# Patient Record
Sex: MALE | Race: WHITE
[De-identification: names, ages, dates, MRNs, and addresses within clinical notes are randomized per-mention and may not be internally consistent; named-entity substitution may affect disease eponyms.]

---

## 2020-02-23 ENCOUNTER — HOSPITAL ENCOUNTER (EMERGENCY)
Dept: HOSPITAL 95 - ER | Age: 35
Discharge: HOME | End: 2020-02-23
Payer: COMMERCIAL

## 2020-02-23 VITALS — HEIGHT: 74 IN | BODY MASS INDEX: 40.43 KG/M2 | WEIGHT: 315 LBS

## 2020-02-23 DIAGNOSIS — F17.200: ICD-10-CM

## 2020-02-23 DIAGNOSIS — Q24.6: ICD-10-CM

## 2020-02-23 DIAGNOSIS — Z95.0: ICD-10-CM

## 2020-02-23 DIAGNOSIS — Z88.5: ICD-10-CM

## 2020-02-23 DIAGNOSIS — Z79.899: ICD-10-CM

## 2020-02-23 DIAGNOSIS — I50.9: Primary | ICD-10-CM

## 2020-02-23 DIAGNOSIS — Z91.14: ICD-10-CM

## 2020-02-23 DIAGNOSIS — E66.01: ICD-10-CM

## 2020-02-23 LAB
ALBUMIN SERPL BCP-MCNC: 3.3 G/DL (ref 3.4–5)
ALBUMIN/GLOB SERPL: 0.9 {RATIO} (ref 0.8–1.8)
ALT SERPL W P-5'-P-CCNC: 42 U/L (ref 12–78)
ANION GAP SERPL CALCULATED.4IONS-SCNC: 8 MMOL/L (ref 6–16)
AST SERPL W P-5'-P-CCNC: 33 U/L (ref 12–37)
BASOPHILS # BLD AUTO: 0.04 K/MM3 (ref 0–0.23)
BASOPHILS NFR BLD AUTO: 0 % (ref 0–2)
BILIRUB SERPL-MCNC: 0.7 MG/DL (ref 0.1–1)
BUN SERPL-MCNC: 14 MG/DL (ref 8–24)
CALCIUM SERPL-MCNC: 8.4 MG/DL (ref 8.5–10.1)
CHLORIDE SERPL-SCNC: 108 MMOL/L (ref 98–108)
CO2 SERPL-SCNC: 25 MMOL/L (ref 21–32)
CREAT SERPL-MCNC: 1.07 MG/DL (ref 0.6–1.2)
DEPRECATED RDW RBC AUTO: 43.8 FL (ref 35.1–46.3)
EOSINOPHIL # BLD AUTO: 0.25 K/MM3 (ref 0–0.68)
EOSINOPHIL NFR BLD AUTO: 2 % (ref 0–6)
ERYTHROCYTE [DISTWIDTH] IN BLOOD BY AUTOMATED COUNT: 13.5 % (ref 11.7–14.2)
GLOBULIN SER CALC-MCNC: 3.7 G/DL (ref 2.2–4)
GLUCOSE SERPL-MCNC: 118 MG/DL (ref 70–99)
HCT VFR BLD AUTO: 46.1 % (ref 37–53)
HGB BLD-MCNC: 15.2 G/DL (ref 13.5–17.5)
IMM GRANULOCYTES # BLD AUTO: 0.05 K/MM3 (ref 0–0.1)
IMM GRANULOCYTES NFR BLD AUTO: 0 % (ref 0–1)
LYMPHOCYTES # BLD AUTO: 1.41 K/MM3 (ref 0.84–5.2)
LYMPHOCYTES NFR BLD AUTO: 12 % (ref 21–46)
MCHC RBC AUTO-ENTMCNC: 33 G/DL (ref 31.5–36.5)
MCV RBC AUTO: 90 FL (ref 80–100)
MONOCYTES # BLD AUTO: 1.03 K/MM3 (ref 0.16–1.47)
MONOCYTES NFR BLD AUTO: 9 % (ref 4–13)
NEUTROPHILS # BLD AUTO: 9.33 K/MM3 (ref 1.96–9.15)
NEUTROPHILS NFR BLD AUTO: 77 % (ref 41–73)
NRBC # BLD AUTO: 0 K/MM3 (ref 0–0.02)
NRBC BLD AUTO-RTO: 0 /100 WBC (ref 0–0.2)
PLATELET # BLD AUTO: 230 K/MM3 (ref 150–400)
POTASSIUM SERPL-SCNC: 4.3 MMOL/L (ref 3.5–5.5)
PROT SERPL-MCNC: 7 G/DL (ref 6.4–8.2)
SODIUM SERPL-SCNC: 141 MMOL/L (ref 136–145)
TROPONIN I SERPL-MCNC: <0.015 NG/ML (ref 0–0.04)

## 2020-05-05 ENCOUNTER — HOSPITAL ENCOUNTER (OUTPATIENT)
Dept: HOSPITAL 95 - MHTC | Age: 35
Discharge: HOME | End: 2020-05-05
Attending: INTERNAL MEDICINE
Payer: COMMERCIAL

## 2020-05-05 VITALS — BODY MASS INDEX: 44.1 KG/M2 | WEIGHT: 315 LBS | HEIGHT: 71 IN

## 2020-05-05 DIAGNOSIS — Z79.899: ICD-10-CM

## 2020-05-05 DIAGNOSIS — Z88.8: ICD-10-CM

## 2020-05-05 DIAGNOSIS — I42.8: Primary | ICD-10-CM

## 2020-05-05 DIAGNOSIS — I44.2: ICD-10-CM

## 2020-05-05 DIAGNOSIS — I11.9: ICD-10-CM

## 2020-05-05 DIAGNOSIS — Z87.891: ICD-10-CM

## 2020-05-05 DIAGNOSIS — E66.9: ICD-10-CM

## 2020-05-05 PROCEDURE — 4A023N7 MEASUREMENT OF CARDIAC SAMPLING AND PRESSURE, LEFT HEART, PERCUTANEOUS APPROACH: ICD-10-PCS | Performed by: INTERNAL MEDICINE

## 2020-05-05 PROCEDURE — B201YZZ PLAIN RADIOGRAPHY OF MULTIPLE CORONARY ARTERIES USING OTHER CONTRAST: ICD-10-PCS | Performed by: INTERNAL MEDICINE

## 2020-05-05 PROCEDURE — C1769 GUIDE WIRE: HCPCS

## 2020-05-05 PROCEDURE — C1894 INTRO/SHEATH, NON-LASER: HCPCS

## 2020-05-05 NOTE — NUR
PT AMB TO BATHROOM, VOIDED QS SITE STABE.  PT DRESSED SELF WITHOUT ISSUE, SITE
UNCHANGED.  TR BAND REMOVED CLOTH DOT AND WRIST IMMOBILIZER PLACED, IV REMOVED
CANNULA INTACT.

## 2020-05-05 NOTE — NUR
PT RECEIVED DISCHARGE INSTRUCTIONS, SITE MANAGEMENT, MED LIST AND AFTER CARE
INSTRUCTIONS; VERBALIZED GOOD UNDERSTANDING.

## 2020-05-05 NOTE — NUR
PT TO RECOVERY ROOM POST PROCEDURE.  PT ALERT AND ORIENTED, COOPERATIVE.  PT
DENIES CHEST PAIN POST PROCEDURE.  MONITOR VPACED 60, B/P 151/90, AFEBRILE,
SPO2 95-97% RA.  R RADIAL SITE NO SWELLING/HEMATOMA, TR BAND IN PLACE.  R AC
VENOUS SITE NO SWELLING/HEMATOMA, OPSITE DRSG IN PLACE INTACT.  PT EATING
LUNCH WITHOUT PROBLEM.

## 2021-08-30 ENCOUNTER — HOSPITAL ENCOUNTER (EMERGENCY)
Dept: HOSPITAL 95 - ER | Age: 36
Discharge: HOME | End: 2021-08-30
Payer: COMMERCIAL

## 2021-08-30 VITALS — HEIGHT: 74 IN | WEIGHT: 315 LBS | BODY MASS INDEX: 40.43 KG/M2

## 2021-08-30 DIAGNOSIS — S80.812A: Primary | ICD-10-CM

## 2021-08-30 DIAGNOSIS — L03.116: ICD-10-CM
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
## 2021-08-30 DIAGNOSIS — F17.200: ICD-10-CM

## 2021-08-30 DIAGNOSIS — Z23: ICD-10-CM

## 2021-08-30 PROCEDURE — A9270 NON-COVERED ITEM OR SERVICE: HCPCS

## 2021-12-26 ENCOUNTER — HOSPITAL ENCOUNTER (INPATIENT)
Dept: HOSPITAL 95 - ER | Age: 36
LOS: 4 days | Discharge: HOME | DRG: 224 | End: 2021-12-30
Attending: HOSPITALIST | Admitting: HOSPITALIST
Payer: COMMERCIAL

## 2021-12-26 VITALS — BODY MASS INDEX: 40.43 KG/M2 | HEIGHT: 74 IN | WEIGHT: 315 LBS

## 2021-12-26 DIAGNOSIS — I44.7: ICD-10-CM

## 2021-12-26 DIAGNOSIS — T82.118A: Primary | ICD-10-CM

## 2021-12-26 DIAGNOSIS — Z95.0: ICD-10-CM

## 2021-12-26 DIAGNOSIS — Z88.8: ICD-10-CM

## 2021-12-26 DIAGNOSIS — Z20.822: ICD-10-CM

## 2021-12-26 DIAGNOSIS — F17.210: ICD-10-CM

## 2021-12-26 DIAGNOSIS — Z79.82: ICD-10-CM

## 2021-12-26 DIAGNOSIS — I50.23: ICD-10-CM

## 2021-12-26 DIAGNOSIS — Z79.899: ICD-10-CM

## 2021-12-26 DIAGNOSIS — I42.8: ICD-10-CM

## 2021-12-26 DIAGNOSIS — E66.01: ICD-10-CM

## 2021-12-26 DIAGNOSIS — I11.0: ICD-10-CM

## 2021-12-26 DIAGNOSIS — I47.2: ICD-10-CM

## 2021-12-26 DIAGNOSIS — I25.10: ICD-10-CM

## 2021-12-26 PROCEDURE — C8929 TTE W OR WO FOL WCON,DOPPLER: HCPCS

## 2021-12-26 PROCEDURE — C1769 GUIDE WIRE: HCPCS

## 2021-12-26 PROCEDURE — C1894 INTRO/SHEATH, NON-LASER: HCPCS

## 2021-12-26 PROCEDURE — C1882 AICD, OTHER THAN SING/DUAL: HCPCS

## 2021-12-26 PROCEDURE — G0378 HOSPITAL OBSERVATION PER HR: HCPCS

## 2021-12-26 PROCEDURE — A9270 NON-COVERED ITEM OR SERVICE: HCPCS

## 2021-12-26 PROCEDURE — C1895 LEAD, AICD, ENDO DUAL COIL: HCPCS

## 2021-12-27 LAB
ALBUMIN SERPL BCP-MCNC: 3.1 G/DL (ref 3.4–5)
ALBUMIN/GLOB SERPL: 0.8 {RATIO} (ref 0.8–1.8)
ALT SERPL W P-5'-P-CCNC: 29 U/L (ref 12–78)
ANION GAP SERPL CALCULATED.4IONS-SCNC: 7 MMOL/L (ref 6–16)
AST SERPL W P-5'-P-CCNC: 19 U/L (ref 12–37)
BASOPHILS # BLD AUTO: 0.04 K/MM3 (ref 0–0.23)
BASOPHILS NFR BLD AUTO: 0 % (ref 0–2)
BILIRUB SERPL-MCNC: 0.9 MG/DL (ref 0.1–1)
BUN SERPL-MCNC: 14 MG/DL (ref 8–24)
CALCIUM SERPL-MCNC: 9.2 MG/DL (ref 8.5–10.1)
CHLORIDE SERPL-SCNC: 108 MMOL/L (ref 98–108)
CO2 SERPL-SCNC: 28 MMOL/L (ref 21–32)
CREAT SERPL-MCNC: 1.14 MG/DL (ref 0.6–1.2)
DEPRECATED RDW RBC AUTO: 48.4 FL (ref 35.1–46.3)
EOSINOPHIL # BLD AUTO: 0.39 K/MM3 (ref 0–0.68)
EOSINOPHIL NFR BLD AUTO: 4 % (ref 0–6)
ERYTHROCYTE [DISTWIDTH] IN BLOOD BY AUTOMATED COUNT: 15.4 % (ref 11.7–14.2)
FLUAV RNA SPEC QL NAA+PROBE: NEGATIVE
FLUBV RNA SPEC QL NAA+PROBE: NEGATIVE
GLOBULIN SER CALC-MCNC: 4 G/DL (ref 2.2–4)
GLUCOSE SERPL-MCNC: 112 MG/DL (ref 70–99)
HCT VFR BLD AUTO: 48.7 % (ref 37–53)
HGB BLD-MCNC: 15.5 G/DL (ref 13.5–17.5)
IMM GRANULOCYTES # BLD AUTO: 0.05 K/MM3 (ref 0–0.1)
IMM GRANULOCYTES NFR BLD AUTO: 1 % (ref 0–1)
LYMPHOCYTES # BLD AUTO: 1.57 K/MM3 (ref 0.84–5.2)
LYMPHOCYTES NFR BLD AUTO: 15 % (ref 21–46)
MAGNESIUM SERPL-MCNC: 2.1 MG/DL (ref 1.6–2.4)
MCHC RBC AUTO-ENTMCNC: 31.8 G/DL (ref 31.5–36.5)
MCV RBC AUTO: 86 FL (ref 80–100)
MONOCYTES # BLD AUTO: 1.11 K/MM3 (ref 0.16–1.47)
MONOCYTES NFR BLD AUTO: 10 % (ref 4–13)
NEUTROPHILS # BLD AUTO: 7.7 K/MM3 (ref 1.96–9.15)
NEUTROPHILS NFR BLD AUTO: 71 % (ref 41–73)
NRBC # BLD AUTO: 0 K/MM3 (ref 0–0.02)
NRBC BLD AUTO-RTO: 0 /100 WBC (ref 0–0.2)
PLATELET # BLD AUTO: 288 K/MM3 (ref 150–400)
POTASSIUM SERPL-SCNC: 4 MMOL/L (ref 3.5–5.5)
PROT SERPL-MCNC: 7.1 G/DL (ref 6.4–8.2)
RSV RNA SPEC QL NAA+PROBE: NEGATIVE
SARS-COV-2 RNA RESP QL NAA+PROBE: NEGATIVE
SODIUM SERPL-SCNC: 143 MMOL/L (ref 136–145)
TROPONIN I SERPL-MCNC: <0.015 NG/ML (ref 0–0.04)

## 2021-12-27 PROCEDURE — 4B02XSZ MEASUREMENT OF CARDIAC PACEMAKER, EXTERNAL APPROACH: ICD-10-PCS

## 2021-12-27 NOTE — NUR
PT C/O DISCOMFORT PROX TO INFILTRATION OF RIGHT FA. AREA SLIGHTLY RED AND
MILDLY SWOLLEN, BLANCHES. HEATING PAD SET UP FOR FOREARM. PT DENIES ALL OTHER
COMPLAINTS. PT DISCONNECTED LEADS TO VOID. PT WAS ASKED TO LEAVE LEADS ON AT
ALL TIMES, PT SLIGHTLY RESISTENT TO REQUEST. IMPORTANCE OF BEING ON MONITOR
D/T POTENTIAL LEATHAL ARRHYTHMIAS EXPLAINED TO PT AND POTENTIAL OF PASSING
OUT AND HITTING FLOOR.

## 2021-12-27 NOTE — NUR
CARE ASSUMPTION
PT ARRIVED FROM AD AND TRANSFERED HIMSELF FROM AD BED TO ICU BED. PT DENYING
ANY DIZZINESS OR FEELING LIGHTHEADED WHEN UP. O2 SATS >92% ON RM AIR. BP
MODERATELY ELEVATED W SBP IN 'S. TELE SHOWING PACED RYTHYM IN THE 80'S.
PT DENYING ANY PAIN OR NAUSEA. AMIODORONE GTT STARTED. DR. ÁLVAREZ CONTACTED TO
CONFIRM NO AMIODORONE BOLUS. PT SITTING IN BED WATCHING TV DENYING ANY OTHER
NEEDS AT THIS TIME.

## 2021-12-27 NOTE — NUR
AMIO GTT REMAINS AT 0.5MG. PT DID NOT HAVE ANY VT THIS SHIFT AND REMAINED
PACED W RATE 70-90. OCCASSIONAL HTN. PT TO BE NPO AFTER MIDNIGHT FOR PROCEDURE
IN AM. PT'S WIFE AT BEDSIDE. WIFE STATES SHE IS STAYING AS SUPPORT PERSON,
STATES D/T HER HUSBANDS ANXIETY DISORDER. GOOD U/O WITH LASIX 80MG THIS AM.

## 2021-12-27 NOTE — NUR
NIGHT SHIFT SUMMARY
PT IS AXO X4. PT HAS DENIED ANY CP OR PRESSURE THIS SHIFT. PT HAS DENIED ANY
DIZZINESS OR FEELING LIGHTHEADED THIS SHIFT. O2 SATS >90% ON RM AIR. BP
MODERATLEY ELEVATED W SBP IN 'S. TELE SHOWING PACED RYTHYM W A WIDE QRS
IN THE 80'S. PT NPO ALL SHIFT. AMIODORONE GTT RUNNING UNINTERUPTED THIS SHIFT.
PT REFUSING TO WEAR SCD'S THIS SHIFT. PT SITTING IN BED WATCHING TV SINCE
ARRIVING TO THE UNIT. WILL REPORT TO ONCOMING RN.

## 2021-12-27 NOTE — NUR
CARE ASSUMPTION
PT SITTING IN BED DENYING ANY PAIN OR NAUSEA EXCEPT FOR DULL ACHE IN L FOREARM
WHERE HIS IV HAD INFILTRATED ON DAYSHIFT. PICS OF L FOREARM TAKEN WHICH WAS
SLIGHTLY RED, HARD AND TENDER TO THE TOUCH. CHARGE RN NOTIFIED. PT REMAINS
100% PACED IN THE 80'S. O2 SATS >92% ON RM AIR. PT DENYING ANY FURTHER NEEDS
AT THIS TIME.

## 2021-12-27 NOTE — NUR
CARE OF PT ASSUMED AT 0700. PT SLEEPING IN BED THIS AM, AWAKENS TO VOICE.
DENIES C/O CHEST PAIN/DISCOMFORT. DENIES SOB, NAUSEA. % VENT PACED, RATE
IN THE 80'S. BP STABLE. NO EPISODES OF VT THIS SHIFT. DR GAONA IN TO SEE PT,
UPDATE GIVEN. ECHO ORDERED. AWAITING CARDIOLOGY TO SEE PT.

## 2021-12-28 LAB
ALBUMIN SERPL BCP-MCNC: 2.9 G/DL (ref 3.4–5)
ANION GAP SERPL CALCULATED.4IONS-SCNC: 6 MMOL/L (ref 6–16)
BASOPHILS # BLD AUTO: 0.04 K/MM3 (ref 0–0.23)
BASOPHILS NFR BLD AUTO: 0 % (ref 0–2)
BUN SERPL-MCNC: 14 MG/DL (ref 8–24)
CALCIUM SERPL-MCNC: 9 MG/DL (ref 8.5–10.1)
CHLORIDE SERPL-SCNC: 107 MMOL/L (ref 98–108)
CO2 SERPL-SCNC: 26 MMOL/L (ref 21–32)
CREAT SERPL-MCNC: 0.99 MG/DL (ref 0.6–1.2)
DEPRECATED RDW RBC AUTO: 46.7 FL (ref 35.1–46.3)
EOSINOPHIL # BLD AUTO: 0.38 K/MM3 (ref 0–0.68)
EOSINOPHIL NFR BLD AUTO: 4 % (ref 0–6)
ERYTHROCYTE [DISTWIDTH] IN BLOOD BY AUTOMATED COUNT: 15.2 % (ref 11.7–14.2)
GLUCOSE SERPL-MCNC: 94 MG/DL (ref 70–99)
HCT VFR BLD AUTO: 44.4 % (ref 37–53)
HGB BLD-MCNC: 14.6 G/DL (ref 13.5–17.5)
IMM GRANULOCYTES # BLD AUTO: 0.04 K/MM3 (ref 0–0.1)
IMM GRANULOCYTES NFR BLD AUTO: 0 % (ref 0–1)
LYMPHOCYTES # BLD AUTO: 1.65 K/MM3 (ref 0.84–5.2)
LYMPHOCYTES NFR BLD AUTO: 15 % (ref 21–46)
MAGNESIUM SERPL-MCNC: 1.7 MG/DL (ref 1.6–2.4)
MCHC RBC AUTO-ENTMCNC: 32.9 G/DL (ref 31.5–36.5)
MCV RBC AUTO: 84 FL (ref 80–100)
MONOCYTES # BLD AUTO: 1.05 K/MM3 (ref 0.16–1.47)
MONOCYTES NFR BLD AUTO: 10 % (ref 4–13)
NEUTROPHILS # BLD AUTO: 7.67 K/MM3 (ref 1.96–9.15)
NEUTROPHILS NFR BLD AUTO: 71 % (ref 41–73)
NRBC # BLD AUTO: 0 K/MM3 (ref 0–0.02)
NRBC BLD AUTO-RTO: 0 /100 WBC (ref 0–0.2)
PHOSPHATE SERPL-MCNC: 3.9 MG/DL (ref 2.5–4.9)
PLATELET # BLD AUTO: 257 K/MM3 (ref 150–400)
POTASSIUM SERPL-SCNC: 3.8 MMOL/L (ref 3.5–5.5)
SODIUM SERPL-SCNC: 139 MMOL/L (ref 136–145)

## 2021-12-28 PROCEDURE — B2111ZZ FLUOROSCOPY OF MULTIPLE CORONARY ARTERIES USING LOW OSMOLAR CONTRAST: ICD-10-PCS | Performed by: INTERNAL MEDICINE

## 2021-12-28 NOTE — NUR
PT BACK IN ROOM. WIFE AT BEDSIDE. SHE IS TO ROOM IN THIS NIGHT. PT CONTINUES
WITHOUT COMPLAINTS OF CHEST PAIN OR PRESSURE. DOES HAVE DRY HACKY TYPE COUGH
THAT IS NONPRODUCTIVE. MAINTAINS 93-94 PERSENT SATURATION ON ROOM AIR.

## 2021-12-28 NOTE — NUR
PT BACK FROM CATH LAB AT 1410. PT WIDE AWAKE AND DENIES COMPLAINTS. TR BAND TO
R RADIAL SITE, WRIST IMMOBILIZER ON R WRIST. SITE WNL, CIRC CHECK WNL. PT UP
EATING LUNCH AT THIS TIME. VITALS AND SITE CHECK Q15, NS STARTED AT 125CC/HR.

## 2021-12-28 NOTE — NUR
CARE ASSUMED OF PT AT 0700, PT SOUNDLY SLEEPING IN BED. % VENT PACED W
RATE 70-80'S, SOME HTN. SATS >90% ON RA. LUNGS CLEAR BUT DIMINISHED T/O. EDEMA
TO BLE IMPROVED FROM YESTERDAY. PT DENIES C/O CHEST PAIN/PRESSURE/SOB. DR LAY
IN AT BEDSIDE THIS AM AND CONSENTED PT FOR CATH LAB. AM MEDS ALONG W LOVENOX
GIVEN PER DR LAY. PHLEBITIS NOTED TO L FA VEIN W 20G IV. REDNESS RUNS FROM IV
SITE TO AC AREA. IV WHICH WAS SALINE LOCKED DC'D IMMEDIATELY. HEATING PAD AT
BEDSIDE. INFILTRATION SITE TO R AC AREA IS STILL RED W SM AMT OF EDEMA,
IMPROVED FROM YESTERDAY; REMAINS SLIGHTLY TENDER.

## 2021-12-28 NOTE — NUR
ASSUMED CARE OF PT AT 1915. REPORT RECEIVED AT BEDSIDE. PT PRESENTS IN BED.
ALERT AND ORIENTED. PLEASANT AND COOPERATIVE WITH CARE AND ASSESSMENT. DENIES
CHEST PAIN OR PRESSURE. WILL REVIEW CHART AND PLAN OF CARE FOR THIS PT.

## 2021-12-28 NOTE — NUR
DR LAY CALLED AND GIVEN UPDATE. DR LAY PLANS ON PLACING AICD IN AM. OKAY FOR
PT TO BE TAKEN OFF MONITOR FOR SHOWER. NPO AFTER MIDNIGHT.

## 2021-12-28 NOTE — NUR
PT AMBULATES TO SHOWER WITH STANDBY ASSIST. PT IN SHOWER AT THIS TIME. DENIES
CHEST PAIN OR PRESSURE. WILL CONTINUE TO MONITOR FOR PT SAFETY.

## 2021-12-28 NOTE — NUR
NIGHT SHIFT [facility]
PT IS AXO X4. PT HAS DENIED ANY CP OR PRESSURE THIS SHIFT. NO EPISODE OF
DIZZINESS EVEN WHILE UP TO VOID. BP MODERATELY HYPERTENSIVE AT TIMES BUT
RESOLVE W/O INTERVENTION. HR HAS REMAINED 100% PACED W A WIDE QRS COMPLEX. PT
AFEBRILE THIS SHIFT. PT NPO SINCE MIDNIGHT IN ANTICIPATION OF PROCEDURE THIS
AM. O2 SATS >92% ON RM AIR ALTHOUGH PT HAD A FEW EPISODES OF APNEA WHILE
SLEEPING CAUSING O2 SATS TO DROP <89%. COVID TEST COMPLETE FOR PRE-PROCEDURE
AND CAME BACK NEGATIVE. WILL REPORT TO ONCOMING RN.

## 2021-12-28 NOTE — NUR
TR BAND AIR REMOVED FROM 1600 TO 1700, NO BLEEDING TO SITE. WILL TAKE TR BAND
OFF IN ONE HOUR PER PROTOCOL AND PLACED CLEAR DRESSING OVER SITE. PT SITTING
UP IN BED EATING DINNER W/O COMPLAINTS.

## 2021-12-29 LAB
ALBUMIN SERPL BCP-MCNC: 3 G/DL (ref 3.4–5)
ANION GAP SERPL CALCULATED.4IONS-SCNC: 5 MMOL/L (ref 6–16)
BASOPHILS # BLD AUTO: 0.04 K/MM3 (ref 0–0.23)
BASOPHILS NFR BLD AUTO: 0 % (ref 0–2)
BUN SERPL-MCNC: 20 MG/DL (ref 8–24)
CALCIUM SERPL-MCNC: 8.7 MG/DL (ref 8.5–10.1)
CHLORIDE SERPL-SCNC: 107 MMOL/L (ref 98–108)
CO2 SERPL-SCNC: 28 MMOL/L (ref 21–32)
CREAT SERPL-MCNC: 1.17 MG/DL (ref 0.6–1.2)
DEPRECATED RDW RBC AUTO: 46.9 FL (ref 35.1–46.3)
EOSINOPHIL # BLD AUTO: 0.39 K/MM3 (ref 0–0.68)
EOSINOPHIL NFR BLD AUTO: 4 % (ref 0–6)
ERYTHROCYTE [DISTWIDTH] IN BLOOD BY AUTOMATED COUNT: 15.4 % (ref 11.7–14.2)
GLUCOSE SERPL-MCNC: 97 MG/DL (ref 70–99)
HCT VFR BLD AUTO: 44.9 % (ref 37–53)
HGB BLD-MCNC: 14.4 G/DL (ref 13.5–17.5)
IMM GRANULOCYTES # BLD AUTO: 0.04 K/MM3 (ref 0–0.1)
IMM GRANULOCYTES NFR BLD AUTO: 0 % (ref 0–1)
LYMPHOCYTES # BLD AUTO: 1.63 K/MM3 (ref 0.84–5.2)
LYMPHOCYTES NFR BLD AUTO: 16 % (ref 21–46)
MAGNESIUM SERPL-MCNC: 2.1 MG/DL (ref 1.6–2.4)
MCHC RBC AUTO-ENTMCNC: 32.1 G/DL (ref 31.5–36.5)
MCV RBC AUTO: 85 FL (ref 80–100)
MONOCYTES # BLD AUTO: 1.05 K/MM3 (ref 0.16–1.47)
MONOCYTES NFR BLD AUTO: 10 % (ref 4–13)
NEUTROPHILS # BLD AUTO: 6.9 K/MM3 (ref 1.96–9.15)
NEUTROPHILS NFR BLD AUTO: 69 % (ref 41–73)
NRBC # BLD AUTO: 0 K/MM3 (ref 0–0.02)
NRBC BLD AUTO-RTO: 0 /100 WBC (ref 0–0.2)
PHOSPHATE SERPL-MCNC: 4.6 MG/DL (ref 2.5–4.9)
PLATELET # BLD AUTO: 252 K/MM3 (ref 150–400)
POTASSIUM SERPL-SCNC: 4.4 MMOL/L (ref 3.5–5.5)
SODIUM SERPL-SCNC: 140 MMOL/L (ref 136–145)

## 2021-12-29 PROCEDURE — 02HK3KZ INSERTION OF DEFIBRILLATOR LEAD INTO RIGHT VENTRICLE, PERCUTANEOUS APPROACH: ICD-10-PCS | Performed by: INTERNAL MEDICINE

## 2021-12-29 PROCEDURE — 0JH609Z INSERTION OF CARDIAC RESYNCHRONIZATION DEFIBRILLATOR PULSE GENERATOR INTO CHEST SUBCUTANEOUS TISSUE AND FASCIA, OPEN APPROACH: ICD-10-PCS | Performed by: INTERNAL MEDICINE

## 2021-12-29 NOTE — NUR
TRANSFER ORDERS FOR PATIENT TO PCU. REPORT CALLED TO [facility]. PT WILL BE
BROUGHT TO PCU BY STAFF POST PROCEDURE.

## 2021-12-29 NOTE — NUR
ASSUMPTION OF CARE
 
PT ARRIVED FROM [facility] ON BED. PT WAS ABLE TO STAND AND TRANSFER TO
BEDSIDE CHAIR UNASSISTED. PT STATED A DESIRE TO TAKE A NAP, PT INFORMED OF
ORDERS FOR IMAGING. R RADIAL SITE C/D/I, NO HEMATOMA NOTED. SURGICAL SITE
DRESSING C/D/I. PT STATES NO C/O PAIN/DISCOMFORT AT THIS TIME.

## 2021-12-29 NOTE — NUR
PT TAKEN TO CATH LAB AROUND 0755 THIS AM. PT ALERT, IN NO APPARENT
DISTRESS, VSS, ROOM AIR, WIFE AT BEDSIDE.

## 2021-12-29 NOTE — NUR
ASSUMED CARE OF PATIENT AT 1900. PLEASANT A/OX4 PT RESTING IN BED WITH SPOUSE
AT BEDSIDE. DRY EYES ARE NOTED. MAINTAINS ABOVE 95% ON RA. LS CRACKLES T/O.
PACED IN 80'S ON TELEMETRY WITH STRONG PEDAL/RADIAL PULSES. AICD PLACED IN THE
MORNING, DRESSING C/D/I AND NO DISCOMFORT NOTED. BLE 2+ PITTING EDEMA, WORSE
ON L THAN R. LOWER LEG CIRCUMFERENCE MEASURED TO KEEP TRACK. 2+ MODERATE
PITTING EDEMA ON STOMACH WITH REDNESS AND SKIN THICKENING NOTED - PT REPORTS
BASELINE. GENERALIZED 1+ EDEMA. 2 INSTANCES OF PHLEBITIS ON L AND R FOREARMS.
L AC AND UPPER ARM IV'S REDRESSED AND STABILIZED. R RADIAL ACCESS SITE C/D/I
WITH ABOUT A DIME SIZED AMOUNT OF MINIMAL REDNESS.
WILL UPDATE AS CHANGES OCCUR.

## 2021-12-29 NOTE — NUR
SHIFT SUMMARY
 
PT HAS BEEN RESTING SINCE ARRIVING FROM HEART CENTER. PT C/O CHEST WALL PAIN
TREATED PER EMAR. PT HAS AMBULATED SELF TO RESTROOM UNASSISTED SEVERAL TIMES.
PT MAKES NEEDS KNOWN AND CALLS APPROPRIATELY. VSS, NO CHANGE TO CURRENT
CONDITION.

## 2021-12-29 NOTE — NUR
PT CURRENTLY NPO PENDING AICD PLACEMENT. PT HAS NOT HAD ANY RUNS OF V-TACH. NO
COMPLAINTS OF CHEST PAIN OR PRESSURE. NO DYSPNEA. DOES CONTINUE WITH
OCCASSIONAL DRY COUGH. HAS BEEN ABLE TO AMBULATE IN UNIT TO SHOWER AND TAKE
SHOWER DURING EVENING IN PREPARATION FOR PROCEDURE. HAS VOIDED Q.S. AND HAS
HAD A LARGE BM (PER PT) WILL CONTINUE TO MONITOR PT, AND WILL REPORT OFF TO
ONCOMING RN.

## 2021-12-30 LAB
ALBUMIN SERPL BCP-MCNC: 2.9 G/DL (ref 3.4–5)
ANION GAP SERPL CALCULATED.4IONS-SCNC: 8 MMOL/L (ref 6–16)
BASOPHILS # BLD AUTO: 0.03 K/MM3 (ref 0–0.23)
BASOPHILS NFR BLD AUTO: 0 % (ref 0–2)
BUN SERPL-MCNC: 16 MG/DL (ref 8–24)
CALCIUM SERPL-MCNC: 8.5 MG/DL (ref 8.5–10.1)
CHLORIDE SERPL-SCNC: 106 MMOL/L (ref 98–108)
CO2 SERPL-SCNC: 26 MMOL/L (ref 21–32)
CREAT SERPL-MCNC: 0.96 MG/DL (ref 0.6–1.2)
DEPRECATED RDW RBC AUTO: 45.9 FL (ref 35.1–46.3)
EOSINOPHIL # BLD AUTO: 0.32 K/MM3 (ref 0–0.68)
EOSINOPHIL NFR BLD AUTO: 3 % (ref 0–6)
ERYTHROCYTE [DISTWIDTH] IN BLOOD BY AUTOMATED COUNT: 15.2 % (ref 11.7–14.2)
GLUCOSE SERPL-MCNC: 97 MG/DL (ref 70–99)
HCT VFR BLD AUTO: 45.1 % (ref 37–53)
HGB BLD-MCNC: 14.4 G/DL (ref 13.5–17.5)
IMM GRANULOCYTES # BLD AUTO: 0.03 K/MM3 (ref 0–0.1)
IMM GRANULOCYTES NFR BLD AUTO: 0 % (ref 0–1)
LYMPHOCYTES # BLD AUTO: 1.3 K/MM3 (ref 0.84–5.2)
LYMPHOCYTES NFR BLD AUTO: 12 % (ref 21–46)
MAGNESIUM SERPL-MCNC: 2.1 MG/DL (ref 1.6–2.4)
MCHC RBC AUTO-ENTMCNC: 31.9 G/DL (ref 31.5–36.5)
MCV RBC AUTO: 85 FL (ref 80–100)
MONOCYTES # BLD AUTO: 1.14 K/MM3 (ref 0.16–1.47)
MONOCYTES NFR BLD AUTO: 10 % (ref 4–13)
NEUTROPHILS # BLD AUTO: 8.24 K/MM3 (ref 1.96–9.15)
NEUTROPHILS NFR BLD AUTO: 74 % (ref 41–73)
NRBC # BLD AUTO: 0 K/MM3 (ref 0–0.02)
NRBC BLD AUTO-RTO: 0 /100 WBC (ref 0–0.2)
PHOSPHATE SERPL-MCNC: 4 MG/DL (ref 2.5–4.9)
PLATELET # BLD AUTO: 226 K/MM3 (ref 150–400)
POTASSIUM SERPL-SCNC: 4 MMOL/L (ref 3.5–5.5)
SODIUM SERPL-SCNC: 140 MMOL/L (ref 136–145)

## 2022-04-20 ENCOUNTER — HOSPITAL ENCOUNTER (EMERGENCY)
Dept: HOSPITAL 95 - ER | Age: 37
Discharge: HOME | End: 2022-04-20
Payer: COMMERCIAL

## 2022-04-20 VITALS — HEIGHT: 74 IN | BODY MASS INDEX: 40.43 KG/M2 | WEIGHT: 315 LBS

## 2022-04-20 DIAGNOSIS — F17.200: ICD-10-CM

## 2022-04-20 DIAGNOSIS — I50.20: ICD-10-CM

## 2022-04-20 DIAGNOSIS — I11.0: Primary | ICD-10-CM

## 2022-04-20 DIAGNOSIS — Z79.82: ICD-10-CM

## 2022-04-20 DIAGNOSIS — Z88.5: ICD-10-CM

## 2022-04-20 DIAGNOSIS — Z79.899: ICD-10-CM

## 2022-04-20 LAB
ANION GAP SERPL CALCULATED.4IONS-SCNC: 5 MMOL/L (ref 6–16)
BASOPHILS # BLD AUTO: 0.04 K/MM3 (ref 0–0.23)
BASOPHILS NFR BLD AUTO: 0 % (ref 0–2)
BUN SERPL-MCNC: 18 MG/DL (ref 8–24)
CALCIUM SERPL-MCNC: 8.6 MG/DL (ref 8.5–10.1)
CHLORIDE SERPL-SCNC: 108 MMOL/L (ref 98–108)
CO2 SERPL-SCNC: 27 MMOL/L (ref 21–32)
CREAT SERPL-MCNC: 1.08 MG/DL (ref 0.6–1.2)
DEPRECATED RDW RBC AUTO: 48.7 FL (ref 35.1–46.3)
EOSINOPHIL # BLD AUTO: 0.3 K/MM3 (ref 0–0.68)
EOSINOPHIL NFR BLD AUTO: 3 % (ref 0–6)
ERYTHROCYTE [DISTWIDTH] IN BLOOD BY AUTOMATED COUNT: 15.4 % (ref 11.7–14.2)
GLUCOSE SERPL-MCNC: 146 MG/DL (ref 70–99)
HCT VFR BLD AUTO: 45.2 % (ref 37–53)
HGB BLD-MCNC: 14.7 G/DL (ref 13.5–17.5)
IMM GRANULOCYTES # BLD AUTO: 0.04 K/MM3 (ref 0–0.1)
IMM GRANULOCYTES NFR BLD AUTO: 0 % (ref 0–1)
LYMPHOCYTES # BLD AUTO: 1.29 K/MM3 (ref 0.84–5.2)
LYMPHOCYTES NFR BLD AUTO: 13 % (ref 21–46)
MAGNESIUM SERPL-MCNC: 2.1 MG/DL (ref 1.6–2.4)
MCHC RBC AUTO-ENTMCNC: 32.5 G/DL (ref 31.5–36.5)
MCV RBC AUTO: 87 FL (ref 80–100)
MONOCYTES # BLD AUTO: 0.81 K/MM3 (ref 0.16–1.47)
MONOCYTES NFR BLD AUTO: 8 % (ref 4–13)
NEUTROPHILS # BLD AUTO: 7.76 K/MM3 (ref 1.96–9.15)
NEUTROPHILS NFR BLD AUTO: 76 % (ref 41–73)
NRBC # BLD AUTO: 0 K/MM3 (ref 0–0.02)
NRBC BLD AUTO-RTO: 0 /100 WBC (ref 0–0.2)
PLATELET # BLD AUTO: 247 K/MM3 (ref 150–400)
POTASSIUM SERPL-SCNC: 4.2 MMOL/L (ref 3.5–5.5)
SODIUM SERPL-SCNC: 140 MMOL/L (ref 136–145)